# Patient Record
(demographics unavailable — no encounter records)

---

## 2018-06-15 NOTE — NUR
Patient discharged to home in stable conditon.  Written and verbal after care 
instructions given. 

Patient verbalizes understanding of instructions. Patient reported feeling 
better prior to discharge. Patient's peripheral IV was removed. Patient able to 
ambulate unassisted with a steady gait. Patient left with all personal 
belongings.